# Patient Record
(demographics unavailable — no encounter records)

---

## 2019-07-04 NOTE — HPEPDOC
Obstetrical History & Physical


General


Date of Admission








History of Present Illness





23 yo  at 38+1 weeks gestation by LMP of 2018 presented to L&D today

with regular, painful contractions acutely worsening as the day progressed.  She

denies any vaginal bleeding or leakage of fluid.  She endorses excellent fetal 

movement.  Pregnancy is uncomplicated.  She had care originally in [facility] but 

transferred to the [facility] in 3rd trimester.


Chief Complaint:  Contractions, term


Information Provided By:  Patient


Age:  22


:  2


Term:  1


Pre-term:  0


Abortions:  0


Livin





Prenatal Care


Prenatal Care:  [facility]





Prenatal Dating


Final EDC:  2019


Final EDC for Daily Update:  2019


Final EDC by:  LMP (2018)


LMP:  Oct 10, 2018





Antepartum Course


Prenatal Diagnos(e)s





Exercise induced asthma


Anxiety and history of panic attacks





Past Medical History


Past Obstetrical History :  


   Past Obstetrical History:  Multigravida ( at 40 weeks in 2017 with pelvis 

proven to 8lbs 1oz)


   Type of Delivery:  Spontaneous Vaginal Del.


   Complications:  No


GYN History:  No pertinent history


Past Medical History


Medical History





Exercise induced asthma


Anxiety and history of panic attacks


Surgical History:  [place] teeth





Family History


Significant Family History:  No pertinent family hx





Social History


Marital Status:  


Family situation:  Spouse/partner deployed ( attempting to return from 

[place] this Monday)


Psychosocial History:  Anxiety (History of anxiety and panic attacks)


* Smoker:  non-smoker


Alcohol:  Denies


Drugs:  denies





Prenatal Imunizations


Tdap status:  current


Influenza Status:  current





Allergies


Coded Allergies:  


     Peanut (Verified  Allergy, Unknown, HIVES, 19)





Medications


Scheduled


Cetirizine HCl/Pseudoephedrine (Zyrtec-D Tablet) 1 Each Tab.er.12h, 1 TAB PO BID

for allergy symptoms





Physical Examination


Physical Examination





GENERAL: Alert and oriented times three.


ABDOMEN: Gravid and non-tender to touch.


FETUS: Is vertex (VTX) by sterile vaginal examination (SVE),


EXTREMITIES: No edema. 





Patient made cervical change from 3 to 5cm while being observed today.





Vital Signs/I&O





Vital Signs








  Date Time  Temp Pulse Resp B/P (MAP) Pulse Ox O2 Delivery O2 Flow Rate FiO2


 


19 19:11 98.1 75 16 134/89 (104)    











Laboratory Data


24H LABS


Laboratory Tests 2


19 14:13: 


Nucleated Red Blood Cells % (auto) 0.0, Hepatitis B Surface Antigen (Rapid) 

NEGATIVE


CBC/BMP


Laboratory Tests


19 14:13








Red Blood Count 3.78 L, Mean Corpuscular Volume 85.4, Mean Corpuscular 

Hemoglobin 28.3, Mean Corpuscular Hemoglobin Concent 33.1, Red Cell Distribution

Width 14.4


Urine Culture:  No Growth





Pertinent Laboratoy Data


Blood Type:  O+


RBC Antibody Screen:  Negative


HIV:  Negative


Hepatitis B:  Negative


Hepatitis C:  Negative


Rapid Plasma Reagin:  Nonreactive


Rubella:  Immune


Varicella:  Immune


Chlamydia/Gonorrhea:  Negative


Group B Streptococcus:  Negative


Quad Screen Test:  Unknown


Cystic Fibrosis:  Unknown


Glucose Tolerance Test:  85





Anatomy Ultrasound


Placenta Location:  Anterior


Normal Anatomy:  Yes


Placenta Previa:  No





Vaginal Examination


Dilation:  5 cm


Effacement:  80%


Station:  -2


Cervical Consistency:  Soft


Cervical Position:  Middle


Fetal Presentation:  Cephalic presentation


Fetal Position:  Vertex (occiput)





Fetal Assessment


Fetal Heart Rate (FHR):  130


Variability:  Moderate


Accelerations:  Positive


Decelerations:  None





Tocometer


Contractions:  Yes


Frequency:  regular, every 2-5 min.


Duration:  greater than 60 seconds


Strength:  palpated as moderate





Assessment/Plan


Assessment





23 yo  at 38+1 weeks gestation presented in labor.  Made cervical change 

during observation from 3 to 5cm.





Plan





Admit to L&D for expectant management of labor.  Will augment labor as 

clinically indicated.


Apply IV fluids.


GBS negative.


Clear liquid diet.


Patient candidate for epidural if desired.


Anticipate .





DO DASHAWN Masters CHRISTOPHER J. DO          2019 19:57

## 2019-07-05 NOTE — IPNPDOC
Text Note


Date of Service


The patient was seen on 7/5/19.





NOTE





Patient comfortable with epidural in place.





Cervix: 7/90/-1.  Incidental SROM with exam, productive of a large amount of 

clear fluid.





FHR Cat I.





Patient progressing well.








DO Tom





VS,Teresa, I+O


VS, Teresa, I+O


Laboratory Tests


7/4/19 14:13








Red Blood Count 3.78 L, Mean Corpuscular Volume 85.4, Mean Corpuscular Hemoglobi

n 28.3, Mean Corpuscular Hemoglobin Concent 33.1, Red Cell Distribution Width 

14.4








Vital Signs








  Date Time  Temp Pulse Resp B/P (MAP) Pulse Ox O2 Delivery O2 Flow Rate FiO2


 


7/4/19 22:38  83 18 124/78 (93)    


 


7/4/19 19:11 98.1       














I&O- Last 24 Hours up to 6 AM 


 


 7/5/19





 06:00


 


Intake Total 1882 ml


 


Output Total 150 ml


 


Balance 1732 ml

















LENORE TAMEZ DO          Jul 5, 2019 00:41

## 2019-07-05 NOTE — DNPDOC
[facility] Delivery Note


Delivery Note





DATE OF DELIVERY: 05Jul2019 at ~0250





PREDELIVERY DIAGNOSIS: 38+2 weeks gestation and active labor





POST DELIVERY DIAGNOSIS: Delivered.  





PROCEDURE: Spontaneous vaginal delivery





OBSTETRICIAN: Dr. Santos





ANESTHESIA: None





ESTIMATED BLOOD LOSS: 200 mL.





FINDINGS: Viable male infant, 6lbs 14oz, Apgars 8/9





DELIVERY SUMMARY:  Presented to room as patient felt urge to push.  Exam 

revealed fetus at +2 station.  The bed was broken down and she was prepped for 

delivery.  With excellent maternal effort her infant delivered after less than 5

minutes of pushing.  Fetal presentation was OLEKSANDR with restitution to LOT.  There 

was a tight, double nuchal cord that was delivered through and reduced manually 

with delivery.  The right anterior shoulder delivered with gentle traction 

followed easily by the remainder of the body.  The infant was dried and 

stimulated on the field and a bulb suction was used.  The infant was placed on 

the maternal abdomen and cried vigorously.  The three vessel cord was then 

clamped and cut by the patient's mother in law after appropriate time delay.  

Third stage was then completed with gentle traction on the cord and it was 

productive of an intact placenta.  The uterine fundus was firmed with massage 

and pitocin was administered IV bolus.  Inspection of the vagina, perineum, and 

cervix revealed no lacerations.  The fundus was palpated again and was firm.  

Sponge, instrument, and needle counts were correct X2.  Mother stable when I le

ft the room.





DO DASHAWN Masters CHRISTOPHER J. DO          Jul 5, 2019 03:05

## 2019-07-05 NOTE — IPN
DATE:  2019

 

This patient requested circumcision of her  male infant.  After discussing

risks and benefits of circumcision, the medical and nonmedical indications,

penile block and aftercare, she expressed understanding of penile block and

aftercare, signed the consent form.  All questions were answered.  20-minute

discussion.

## 2019-07-06 NOTE — DSES
DATE OF ADMISSION:  2019

DATE OF DISCHARGE:  2019

 

 

This is a 22-year-old  2, now para 2, admitted at 31 and 1 weeks of

gestation with contractions, spontaneous vaginal delivery male, 6 pounds 4

ounces.  Apgar scores of 8 and 9 at one and five minutes respectively.  Admitting

hemoglobin 10.7 hematic 32.3 and platelets were 119.

 

On discharge, her blood pressure was 117/75, respirations 16, pulse 85,

temperature is 98.0.  We discussed phlebitis, cystitis, mastitis, endometritis

and cellulitis, diet, exercise, pain management, perineal, breast and wound care.

 

 

The patient was given her medications upon discharge the rest examination

unremarkable.  Normocephalic, atraumatic.  Neck:  Full range of motion.  Pupils

equal and reactive to light.  Distal pulses symmetric.  No evidence of deep

venous thrombosis (DVT), pulmonary embolism (PE), or superficial phlebitis.

Chest is clear bilaterally bases.  No wheeze or rhonchi.  No costovertebral angle

(CVA) tenderness.

Abdomen soft uterus two below.  Lochia is moderate.  Four quadrant bowel sounds

are normal and perineum is intact.

 

There are no rashes, lesions or pruritus.  No complaints of arthralgia, myalgia.

No complaint of joint pain.  No complaint of cough, wheeze, shortness breath or

dyspnea on exertional.  No nausea, vomiting, diarrhea or constipation.  No

urgency or frequency. The patient is presently breastfeeding, going well.  The

patient plans on a  six week postpartum checkup at [facility] and we will

discuss birth control at that time.  All questions were answered.  20-minute

discussion.